# Patient Record
Sex: MALE | Race: WHITE | NOT HISPANIC OR LATINO | Employment: STUDENT | URBAN - METROPOLITAN AREA
[De-identification: names, ages, dates, MRNs, and addresses within clinical notes are randomized per-mention and may not be internally consistent; named-entity substitution may affect disease eponyms.]

---
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
## 2024-01-10 ENCOUNTER — OFFICE VISIT (OUTPATIENT)
Dept: GASTROENTEROLOGY | Facility: CLINIC | Age: 19
End: 2024-01-10
Payer: COMMERCIAL

## 2024-01-10 VITALS
BODY MASS INDEX: 20.92 KG/M2 | WEIGHT: 138 LBS | HEART RATE: 63 BPM | DIASTOLIC BLOOD PRESSURE: 64 MMHG | SYSTOLIC BLOOD PRESSURE: 120 MMHG | HEIGHT: 68 IN | OXYGEN SATURATION: 98 %

## 2024-01-10 DIAGNOSIS — R10.30 LOWER ABDOMINAL PAIN: ICD-10-CM

## 2024-01-10 DIAGNOSIS — R19.7 DIARRHEA, UNSPECIFIED TYPE: ICD-10-CM

## 2024-01-10 DIAGNOSIS — Z83.79 FAMILY HISTORY OF INFLAMMATORY BOWEL DISEASE: Primary | ICD-10-CM

## 2024-01-10 PROCEDURE — 99213 OFFICE O/P EST LOW 20 MIN: CPT | Performed by: PHYSICIAN ASSISTANT

## 2024-01-10 RX ORDER — DICYCLOMINE HCL 20 MG
20 TABLET ORAL EVERY 6 HOURS
Qty: 120 TABLET | Refills: 5 | Status: SHIPPED | OUTPATIENT
Start: 2024-01-10

## 2024-01-10 NOTE — PROGRESS NOTES
[facility] - Outpatient Follow-up Note  Tiburcio Aguayo 18 y.o. male MRN: [id number]  Encounter: [id number]          ASSESSMENT AND PLAN:      1. Family history of inflammatory bowel disease  Patient with family history of IBD in his brother and his sister although his symptoms seem to be more consistent with IBS, would recommend to get the fecal calprotectin as previously ordered at the very least but told him he can hold off on infectious studies since he is only going about 1-2 times per day but does have mildly elevated CRP so would prefer getting fecal calprotectin and if persistent elevation then may need colonoscopy.  Will repeat CRP at this time as well.    2. Diarrhea, unspecified type  Suspect symptoms are related to IBS as he has no significant findings on blood work such as anemia, only finding is mildly elevated CRP which we will repeat as above    3. Lower abdominal pain  Improved with Bentyl and he is taking this about twice a day which is helping with abdominal cramping as well as slowing stool frequency      We will see him in about 5 months for a follow-up.  Recommend to get the CRP repeated along with a fecal calprotectin as above need to have a mildly decreased IgA which we will repeat at this time.    ______________________________________________________________________    SUBJECTIVE:    This is a 18-year-old male who presents  for followup. Initially presented with complaints of abdominal cramping and decreased appetite.  Patient reports that the symptoms have been ongoing for few months and generally he has symptoms of cramping throughout the day and then has several bowel movements throughout the day about 3-5 times.  Does have family history of IBD in his brother and his sister and they were diagnosed with Crohn's disease.  Patient denies any nocturnal symptoms denies any significant weight loss.  He otherwise denies any rectal bleeding.  He did travel to  [place] in May of this year but reports that he did not have symptoms at that time.  He otherwise denies any rashes or fevers.  Denies any significant reflux symptoms.  Denies any difficulty swallowing.  Denies any significant nausea or vomiting symptoms.  Denies any excessive NSAID use.  Occasionally will take ibuprofen.  Patient does report that he had some fluctuation in bowel habits after he started taking Zoloft last year.  Reports that he was started on this for anxiety.  Currently a sophomore at [employer].  After last visit, blood work was performed and stool studies were ordered.  Does not appear stool studies were performed but blood work was essentially normal.  Patient was noted to have mildly decreased IgA which was to be repeated and a C-reactive protein noted to be elevated at 13 but top normal being 10 at the lab but this was drawn at.  Patient was started on Bentyl which was helping with his symptoms.  Currently, he states that he sometimes only goes 1-2 times per day previously 3-5.  He otherwise reports that he sometimes will have cramping sensation but this only last about half a day and improves with bowel movements.  No nocturnal symptoms and weight has been stable.    REVIEW OF SYSTEMS IS OTHERWISE NEGATIVE.      Historical Information   Past Medical History:   Diagnosis Date    Allergic rhinitis     Anxiety      No past surgical history on file.  Social History   Social History     Substance and Sexual Activity   Alcohol Use Never     Social History     Substance and Sexual Activity   Drug Use Never     Social History     Tobacco Use   Smoking Status Never    Passive exposure: Never   Smokeless Tobacco Never     No family history on file.    Meds/Allergies       Current Outpatient Medications:     CETIRIZINE HCL OP    dicyclomine (BENTYL) 20 mg tablet    sertraline (ZOLOFT) 25 mg tablet    sertraline (ZOLOFT) 50 mg tablet    No Known Allergies        Objective     There were no vitals  taken for this visit. There is no height or weight on file to calculate BMI.      PHYSICAL EXAM:      General Appearance:   Alert, cooperative, no distress   HEENT:   Normocephalic, atraumatic, anicteric.     Neck:  Supple, symmetrical, trachea midline   Lungs:   Clear to auscultation bilaterally; no rales, rhonchi or wheezing; respirations unlabored    Heart::   Regular rate and rhythm; no murmur, rub, or gallop.   Abdomen:   Soft, non-tender, non-distended; normal bowel sounds; no masses, no organomegaly    Genitalia:   Deferred    Rectal:   Deferred    Extremities:  No cyanosis, clubbing or edema    Pulses:  2+ and symmetric    Skin:  No jaundice, rashes, or lesions    Lymph nodes:  No palpable cervical lymphadenopathy        Lab Results:   No visits with results within 1 Day(s) from this visit.   Latest known visit with results is:   No results found for any previous visit.         Radiology Results:   No results found.

## 2024-09-15 ENCOUNTER — HOSPITAL ENCOUNTER (EMERGENCY)
Facility: HOSPITAL | Age: 19
Discharge: HOME | End: 2024-09-15
Attending: EMERGENCY MEDICINE
Payer: COMMERCIAL

## 2024-09-15 VITALS
SYSTOLIC BLOOD PRESSURE: 127 MMHG | DIASTOLIC BLOOD PRESSURE: 58 MMHG | HEART RATE: 90 BPM | BODY MASS INDEX: 21.3 KG/M2 | WEIGHT: 138.01 LBS | OXYGEN SATURATION: 97 % | RESPIRATION RATE: 20 BRPM | TEMPERATURE: 98.5 F

## 2024-09-15 DIAGNOSIS — J98.01 BRONCHOSPASM: ICD-10-CM

## 2024-09-15 DIAGNOSIS — U07.1 COVID-19: Primary | ICD-10-CM

## 2024-09-15 DIAGNOSIS — J02.9 SORE THROAT: ICD-10-CM

## 2024-09-15 LAB
ATRIAL RATE: 93 BPM
FLUAV AG UPPER RESP QL IA.RAPID: NEGATIVE
FLUBV AG UPPER RESP QL IA.RAPID: NEGATIVE
P AXIS: 74 DEGREES
PR INTERVAL: 114 MS
QRS AXIS: 75 DEGREES
QRSD INTERVAL: 98 MS
QT INTERVAL: 346 MS
QTC INTERVAL: 430 MS
SARS-COV+SARS-COV-2 AG RESP QL IA.RAPID: POSITIVE
T WAVE AXIS: 18 DEGREES
VENTRICULAR RATE: 93 BPM

## 2024-09-15 PROCEDURE — 87804 INFLUENZA ASSAY W/OPTIC: CPT | Performed by: EMERGENCY MEDICINE

## 2024-09-15 PROCEDURE — 93010 ELECTROCARDIOGRAM REPORT: CPT | Performed by: INTERNAL MEDICINE

## 2024-09-15 PROCEDURE — 99284 EMERGENCY DEPT VISIT MOD MDM: CPT

## 2024-09-15 PROCEDURE — 93005 ELECTROCARDIOGRAM TRACING: CPT

## 2024-09-15 PROCEDURE — 99284 EMERGENCY DEPT VISIT MOD MDM: CPT | Performed by: EMERGENCY MEDICINE

## 2024-09-15 PROCEDURE — 87811 SARS-COV-2 COVID19 W/OPTIC: CPT | Performed by: EMERGENCY MEDICINE

## 2024-09-15 RX ORDER — ALBUTEROL SULFATE 90 UG/1
2 INHALANT RESPIRATORY (INHALATION) ONCE
Status: COMPLETED | OUTPATIENT
Start: 2024-09-15 | End: 2024-09-15

## 2024-09-15 RX ORDER — NAPROXEN 250 MG/1
250 TABLET ORAL 2 TIMES DAILY WITH MEALS
Qty: 20 TABLET | Refills: 0 | Status: SHIPPED | OUTPATIENT
Start: 2024-09-15

## 2024-09-15 RX ORDER — ALBUTEROL SULFATE 90 UG/1
2 INHALANT RESPIRATORY (INHALATION) EVERY 4 HOURS PRN
Qty: 6.7 G | Refills: 0 | Status: SHIPPED | OUTPATIENT
Start: 2024-09-15

## 2024-09-15 RX ADMIN — DEXAMETHASONE SODIUM PHOSPHATE 10 MG: 10 INJECTION, SOLUTION INTRAMUSCULAR; INTRAVENOUS at 10:40

## 2024-09-15 RX ADMIN — ALBUTEROL SULFATE 2 PUFF: 90 AEROSOL, METERED RESPIRATORY (INHALATION) at 10:41

## 2024-09-15 NOTE — Clinical Note
Tiburcio Aguayo was seen and treated in our emergency department on 9/15/2024.                Diagnosis:     Tiburcio  .    He may return on this date: 09/23/2024         If you have any questions or concerns, please don't hesitate to call.      Eva Leach, FATUMA    ______________________________           _______________          _______________  Hospital Representative                              Date                                Time

## 2024-09-15 NOTE — ED PROVIDER NOTES
Pt Name: Tiburcio Aguayo  MRN: [id number]  Birthdate 2005  Age/Sex: 19 y.o. male  Date of evaluation: 9/15/2024  PCP: No primary care provider on file.    CHIEF COMPLAINT    Chief Complaint   Patient presents with    Shortness of Breath     Pt started with sore throat yesterday. Today woke up with tight throat, tightness in chest. Pt used 1 nebulizer PTA, hx of croup as child.        FINAL IMPRESSION    Final diagnoses:   COVID-19   Sore throat   Bronchospasm         DISPOSITION/PLAN    Presentation as above with sore throat and cough felt most consistent with COVID-19.  Description of shortness of breath prior to arrival that resolved albuterol concerning for some bronchospasm, likely viral induced although patient has had similar episodes in the past and has never undergone pulmonary function test per parents.  Do not suspect PTA, RPA, bacterial pneumonia, sepsis, angioedema, airway occlusion, other acute life threat.  Counseled regarding diagnosis of COVID-19, discharged with strict return precautions, follow with primary care doctor as well as pulmonology for pulmonary function test once this acute illness has resolved.  Time reflects when diagnosis was documented in both MDM as applicable and the Disposition within this note       Time User Action Codes Description Comment    9/15/2024 11:51 AM Zhang Miramontes Add [U07.1] COVID-19     9/15/2024 11:51 AM Zhang Miramontes Add [J02.9] Sore throat     9/15/2024 11:52 AM Zhang Miramontes Add [J98.01] Bronchospasm           ED Disposition       ED Disposition   Discharge    Condition   Stable    Date/Time   Sun Sep 15, 2024 11:51 AM    Comment   Tiburcio Aguayo discharge to home/self care.                   Follow-up Information       Follow up With Specialties Details Why Contact Info Additional Information    [facility] Emergency Department Emergency Medicine Go to  If symptoms worsen [address]  [place] [address]  [phone] [facility] Emergency Department, [address], [place], [place], [address]    [facility] Family Medicine Call in 1 day To discuss this visit and schedule close follow-up [address]  [address]  [facility] [phone] [facility], [address], [place] [address] [phone]    [facility] Pulmonology Call in 1 day To schedule pulmonary function testing once this current illness has resolved. [address]  [address]  [facility] [address] [phone] [facility], [address], [place], [place], [address] [phone]                HPI    19 y.o. male presenting with shortness of breath.  Patient states that he had a mild sore throat yesterday, woke today with worsening sore throat as well as a feeling that he could not breathe or catch his breath.  Patient had a nebulizer machine that was originally prescribed to him during a case of childhood croup, with albuterol but apparently was prescribed in 2021, used a nebulizer treatment with substantial improvement of symptoms.  He now complains of mild sore throat, pain is mild, burning, worse with swallowing, better with rest, nonradiating.  He denies trauma, fever, numbness, weakness, chest pain, other symptoms.      Past Medical and Surgical History    Past Medical History:   Diagnosis Date    Allergic rhinitis     Anxiety        History reviewed. No pertinent surgical history.    History reviewed. No pertinent family history.    Social History     Tobacco Use    Smoking status: Never     Passive exposure: Never    Smokeless tobacco: Never   Vaping Use    Vaping status: Never Used   Substance Use Topics    Alcohol use: Never    Drug use: Never           Allergies    No Known Allergies    Home Medications    Prior to Admission  medications    Medication Sig Start Date End Date Taking? Authorizing Provider   albuterol (ProAir HFA) 90 mcg/act inhaler Inhale 2 puffs every 4 (four) hours as needed for wheezing 9/15/24  Yes Zhang Miramontes MD   naproxen (NAPROSYN) 250 mg tablet Take 1 tablet (250 mg total) by mouth 2 (two) times a day with meals 9/15/24  Yes Zhang Miramontes MD   CETIRIZINE HCL OP Take by mouth  Patient not taking: Reported on 10/27/2023    Historical Provider, MD   dicyclomine (BENTYL) 20 mg tablet Take 1 tablet (20 mg total) by mouth every 6 (six) hours 10/27/23   Leti Mendez PA-C   dicyclomine (BENTYL) 20 mg tablet Take 1 tablet (20 mg total) by mouth every 6 (six) hours 1/10/24   Leti Mendez PA-C   sertraline (ZOLOFT) 50 mg tablet  10/12/23   Historical Provider, MD           Review of Systems    Review of Systems   Constitutional:  Negative for appetite change, chills and diaphoresis.   HENT:  Positive for sore throat. Negative for drooling, facial swelling, trouble swallowing and voice change.    Respiratory:  Positive for cough and shortness of breath. Negative for apnea and wheezing.    Cardiovascular:  Negative for chest pain and leg swelling.   Gastrointestinal:  Negative for abdominal distention, abdominal pain, diarrhea, nausea and vomiting.   Genitourinary:  Negative for dysuria and urgency.   Musculoskeletal:  Negative for arthralgias, back pain, gait problem and neck pain.   Skin:  Negative for color change, rash and wound.   Neurological:  Negative for seizures, speech difficulty, weakness and headaches.   Psychiatric/Behavioral:  Negative for agitation, behavioral problems and dysphoric mood. The patient is not nervous/anxious.            All other systems reviewed and negative.    Physical Exam      ED Triage Vitals [09/15/24 1000]   Temperature Pulse Respirations Blood Pressure SpO2   98.5 °F (36.9 °C) 100 (!) 26 125/60 99 %      Temp Source Heart Rate Source Patient Position -  Orthostatic VS BP Location FiO2 (%)   Oral Monitor Sitting Right arm --      Pain Score       --               Physical Exam  Vitals and nursing note reviewed.   Constitutional:       General: He is not in acute distress.     Appearance: He is well-developed. He is not ill-appearing, toxic-appearing or diaphoretic.   HENT:      Head: Normocephalic and atraumatic.      Right Ear: External ear normal.      Left Ear: External ear normal.      Nose: Nose normal. No congestion or rhinorrhea.      Mouth/Throat:      Mouth: Mucous membranes are moist.      Pharynx: Oropharynx is clear. Posterior oropharyngeal erythema present. No oropharyngeal exudate.      Comments: Erythema of the posterior oropharynx, no significant swelling, phonating normally, handling secretions, no trismus.  No bulge in the soft palate, no uvular deviation  Eyes:      Conjunctiva/sclera: Conjunctivae normal.      Pupils: Pupils are equal, round, and reactive to light.   Neck:      Trachea: No tracheal deviation.   Cardiovascular:      Rate and Rhythm: Normal rate and regular rhythm.      Pulses: Normal pulses.      Heart sounds: Normal heart sounds. No murmur heard.  Pulmonary:      Effort: Pulmonary effort is normal. No respiratory distress.      Breath sounds: Normal breath sounds. No stridor. No wheezing or rales.   Abdominal:      General: There is no distension.      Palpations: Abdomen is soft.      Tenderness: There is no abdominal tenderness. There is no guarding or rebound.   Musculoskeletal:         General: No deformity. Normal range of motion.      Cervical back: Normal range of motion and neck supple. No rigidity or tenderness.      Right lower leg: No edema.      Left lower leg: No edema.   Skin:     General: Skin is warm and dry.      Capillary Refill: Capillary refill takes less than 2 seconds.      Findings: No rash.   Neurological:      Mental Status: He is alert and oriented to person, place, and time.   Psychiatric:          Behavior: Behavior normal.         Thought Content: Thought content normal.         Judgment: Judgment normal.              Diagnostic Results      Labs:    Results Reviewed       Procedure Component Value Units Date/Time    FLU/COVID Rapid Antigen (30 min. TAT) - Preferred screening test in ED [[id number]]  (Abnormal) Collected: 09/15/24 1052    Lab Status: Final result Specimen: Nares from Nose Updated: 09/15/24 1115     SARS COV Rapid Antigen Positive     Influenza A Rapid Antigen Negative     Influenza B Rapid Antigen Negative    Narrative:      This test has been performed using the [employer] Stephanie 2 FLU+SARS Antigen test under the Emergency Use Authorization (EUA). This test has been validated by the  and verified by the performing laboratory. The Stephanie uses lateral flow immunofluorescent sandwich assay to detect SARS-COV, Influenza A and Influenza B Antigen.     The [employer] Stephanie 2 SARS Antigen test does not differentiate between SARS-CoV and SARS-CoV-2.     Negative results are presumptive and may be confirmed with a molecular assay, if necessary, for patient management. Negative results do not rule out SARS-CoV-2 or influenza infection and should not be used as the sole basis for treatment or patient management decisions. A negative test result may occur if the level of antigen in a sample is below the limit of detection of this test.     Positive results are indicative of the presence of viral antigens, but do not rule out bacterial infection or co-infection with other viruses.     All test results should be used as an adjunct to clinical observations and other information available to the provider.    FOR PEDIATRIC PATIENTS - copy/paste COVID Guidelines URL to browser: [email or web address]            All labs reviewed and utilized in the medical decision making process    Radiology:    No orders to display       All radiology studies  independently viewed by me and interpreted by the radiologist.    Procedure    Procedures        ED Course of Care and Re-Assessments      Symptoms much improved with dexamethasone and albuterol.  COVID swab sent and returned positive.    Medications   dexamethasone oral liquid 10 mg 1 mL (10 mg Oral Given 9/15/24 1040)   albuterol (PROVENTIL HFA,VENTOLIN HFA) inhaler 2 puff (2 puffs Inhalation Given 9/15/24 1041)           PATIENT REFERRED TO:    [facility] Emergency Department  [address]  [place] [address]  [phone]  Go to   If symptoms worsen    [facility]  [address]  [address]  [facility] [address] [phone]  Call in 1 day  To discuss this visit and schedule close follow-up    [facility]  [address]  [address]  [facility] [address] [phone]  Call in 1 day  To schedule pulmonary function testing once this current illness has resolved.      DISCHARGE MEDICATIONS:    Discharge Medication List as of 9/15/2024 12:02 PM        START taking these medications    Details   albuterol (ProAir HFA) 90 mcg/act inhaler Inhale 2 puffs every 4 (four) hours as needed for wheezing, Starting Sun 9/15/2024, Print      naproxen (NAPROSYN) 250 mg tablet Take 1 tablet (250 mg total) by mouth 2 (two) times a day with meals, Starting Sun 9/15/2024, Print           CONTINUE these medications which have NOT CHANGED    Details   CETIRIZINE HCL OP Take by mouth, Historical Med      !! dicyclomine (BENTYL) 20 mg tablet Take 1 tablet (20 mg total) by mouth every 6 (six) hours, Starting Fri 10/27/2023, Normal      !! dicyclomine (BENTYL) 20 mg tablet Take 1 tablet (20 mg total) by mouth every 6 (six) hours, Starting Wed 1/10/2024, Normal      sertraline (ZOLOFT) 50 mg tablet Historical Med       !! - Potential duplicate medications found. Please discuss with provider.          No discharge procedures on  "file.         Zhang Miramontes MD    Portions of the record may have been created with voice recognition software.  Occasional wrong word or \"sound alike\" substitutions may have occurred due to the inherent limitations of voice recognition software.  Please read the chart carefully and recognize, using context, where substitutions have occurred     Zhang Miramontes MD  09/15/24 9464    "

## 2024-09-16 NOTE — ED NOTES
Pt called asking for a work note. Work note printed and put at registation for someone to  for him. Pt aware and verbalized understanding.      Eva Leach RN  09/16/24 1919

## 2025-02-17 ENCOUNTER — TELEPHONE (OUTPATIENT)
Age: 20
End: 2025-02-17

## 2025-02-17 DIAGNOSIS — F41.9 ANXIETY: Primary | ICD-10-CM

## 2025-02-17 NOTE — TELEPHONE ENCOUNTER
Please call patient offer 30 min establish care-if he'd like while he waits for psych. I am list as pcp but never saw patient

## 2025-02-17 NOTE — TELEPHONE ENCOUNTER
Patients parents called because he has been dealing with anxiety and would like to discuss medications with a Psych/mental health doctor but they are not able to get him in for a consult for a long time. They were advised to reach out to his pcp Jane for a expedited referral. They said he would like to see Dr.Erik Mejia or Carlos Gonzales. Please advise. Thank you.

## 2025-02-17 NOTE — TELEPHONE ENCOUNTER
Father and Mother called. They thought they had spoke to the [place] office and they told him 3 mos to 1 yr for appts but I informed them that any of the office numbers are routed to the Call Center and that son was placed on the [facility] for MM and TT.    He states they are looking for an appt for son ASAP. Writer informed that he is on the WL and we can not give a timeframe. Writer informed that they should call his PCP and let them know what is going on and to ask them to send a referral. Writer added PCP to the file. It is a [facility]'s facility in [place], [place].    Writer asked if outside resource pkt was sent and he verified they received it already.

## 2025-02-17 NOTE — TELEPHONE ENCOUNTER
Patients mother called in with the patient on the line, to be added to the wait list for psyc services.     Patient has been added to the Medication Management and Talk Therapy wait list without a referral.    Insurance: Blue Cross  Insurance Type:    Commercial [x]   Medicaid []   [facility] (if applicable)   Medicare []  Location Preference: Any  Provider Preference: Any  Virtual: Yes [x] No []  Were outside resources sent: Yes [x] No []

## 2025-02-17 NOTE — TELEPHONE ENCOUNTER
Called and spoke to mom she stated that he actually got an appt with his old ped doctor , which specializes in physc ,, this week ,,   Mom stated that she really would like him to establish care with real so she will keep us posted with what happens with his ped doctor and schedule an appt with real after that she stated she does not want to over whelm him at this time

## 2025-02-17 NOTE — TELEPHONE ENCOUNTER
Mother called in wanting to schedule a np appt with for pt. Writer let mother know that pt would have to confirm that they are looking for services. Mother understood

## 2025-06-25 ENCOUNTER — OFFICE VISIT (OUTPATIENT)
Dept: FAMILY MEDICINE CLINIC | Facility: CLINIC | Age: 20
End: 2025-06-25
Payer: COMMERCIAL

## 2025-06-25 VITALS
TEMPERATURE: 98.6 F | HEART RATE: 86 BPM | DIASTOLIC BLOOD PRESSURE: 66 MMHG | BODY MASS INDEX: 22.91 KG/M2 | WEIGHT: 146 LBS | SYSTOLIC BLOOD PRESSURE: 116 MMHG | RESPIRATION RATE: 16 BRPM | OXYGEN SATURATION: 99 % | HEIGHT: 67 IN

## 2025-06-25 DIAGNOSIS — F43.20 ADJUSTMENT REACTION OF ADOLESCENCE: ICD-10-CM

## 2025-06-25 DIAGNOSIS — F41.9 ANXIETY: ICD-10-CM

## 2025-06-25 DIAGNOSIS — Z00.00 ANNUAL PHYSICAL EXAM: Primary | ICD-10-CM

## 2025-06-25 PROCEDURE — 99385 PREV VISIT NEW AGE 18-39: CPT | Performed by: NURSE PRACTITIONER

## 2025-06-25 NOTE — PROGRESS NOTES
Adult Annual Physical  Name: Tiburcio Aguayo      : 2005      MRN: [id number]  Encounter Provider: AVE Lo  Encounter Date: 2025   [facility]    :  Assessment & Plan  Annual physical exam               Preventive Screenings:  - Diabetes Screening: risks/benefits discussed and patient declines  - Cholesterol Screening: risks/benefits discussed and patient declines   - Hepatitis C screening: risks/benefits discussed and patient declines   - HIV screening: risks/benefits discussed and patient declines   - Colon cancer screening: screening not indicated   - Lung cancer screening: screening not indicated   - Prostate cancer screening: screening not indicated     Immunizations:  - Immunizations due: Tdap and HPV (Gardasil 9)    Counseling/Anticipatory Guidance:    - Dental health: discussed importance of regular tooth brushing, flossing, and dental visits.   - Sexual health: discussed sexually transmitted diseases, partner selection, use of condoms, avoidance of unintended pregnancy, and contraceptive alternatives.   - Diet: discussed recommendations for a healthy/well-balanced diet.   - Exercise: the importance of regular exercise/physical activity was discussed. Recommend exercise 3-5 times per week for at least 30 minutes.   - Injury prevention: discussed safety/seat belts, safety helmets, smoke detectors, carbon monoxide detectors, and smoking near bedding or upholstery.     Depression Screening and Follow-up Plan: Patient was screened for depression during today's encounter. They screened negative with a PHQ-2 score of 2.      History of Present Illness     Adult Annual Physical:  Patient presents for annual physical.     Diet and Physical Activity:  - Diet/Nutrition: well balanced diet.  - Exercise: 3-4 times a week on average.    Depression Screening:  - PHQ-2 Score: 2    General Health:  - Sleep: sleeps well.  - Hearing: normal hearing  "bilateral ears.  - Vision: no vision problems.  - Dental: regular dental visits.    /GYN Health:    - History of STDs: no     Health:  - History of STDs: no.   Review of Systems   Constitutional:  Negative for fatigue, fever and unexpected weight change.   HENT:  Negative for trouble swallowing.    Respiratory:  Negative for cough and shortness of breath.    Cardiovascular:  Negative for chest pain, palpitations and leg swelling.   Gastrointestinal:  Negative for abdominal pain and blood in stool.   Endocrine: Negative.    Genitourinary:  Negative for dysuria, genital sores, hematuria and testicular pain.   Musculoskeletal:  Negative for arthralgias and back pain.   Skin:  Negative for pallor and rash.   Neurological:  Negative for dizziness, syncope and weakness.   Hematological:  Does not bruise/bleed easily.   Psychiatric/Behavioral:  Positive for sleep disturbance. Negative for dysphoric mood. The patient is nervous/anxious.        Objective   /66 (BP Location: Left arm, Patient Position: Sitting, Cuff Size: Standard)   Pulse 86   Temp 98.6 °F (37 °C) (Temporal)   Resp 16   Ht 5' 7\" (1.702 m)   Wt 66.2 kg (146 lb)   SpO2 99%   BMI 22.87 kg/m²     Physical Exam  Vitals and nursing note reviewed.   Constitutional:       General: He is not in acute distress.     Appearance: Normal appearance. He is well-developed.   HENT:      Head: Normocephalic and atraumatic.      Right Ear: Tympanic membrane normal.      Left Ear: Tympanic membrane normal.      Mouth/Throat:      Mouth: Mucous membranes are moist.      Pharynx: Oropharynx is clear.     Eyes:      Conjunctiva/sclera: Conjunctivae normal.      Pupils: Pupils are equal, round, and reactive to light.       Cardiovascular:      Rate and Rhythm: Normal rate and regular rhythm.      Pulses: Normal pulses.      Heart sounds: Normal heart sounds.   Pulmonary:      Effort: Pulmonary effort is normal. No respiratory distress.      Breath sounds: Normal " breath sounds.   Abdominal:      Palpations: Abdomen is soft.      Tenderness: There is no abdominal tenderness.     Musculoskeletal:         General: No deformity.      Cervical back: Normal range of motion and neck supple.   Lymphadenopathy:      Cervical: No cervical adenopathy.     Skin:     General: Skin is warm and dry.      Capillary Refill: Capillary refill takes less than 2 seconds.      Coloration: Skin is not pale.     Neurological:      General: No focal deficit present.      Mental Status: He is alert.      Motor: No weakness.     Psychiatric:         Mood and Affect: Mood normal.         Behavior: Behavior normal.

## 2025-06-25 NOTE — PATIENT INSTRUCTIONS
"Patient Education     Routine physical for adults   The Basics   Written by the doctors and editors at [facility]   What is a physical? -- A physical is a routine visit, or \"check-up,\" with your doctor. You might also hear it called a \"wellness visit\" or \"preventive visit.\"  During each visit, the doctor will:   Ask about your physical and mental health   Ask about your habits, behaviors, and lifestyle   Do an exam   Give you vaccines if needed   Talk to you about any medicines you take   Give advice about your health   Answer your questions  Getting regular check-ups is an important part of taking care of your health. It can help your doctor find and treat any problems you have. But it's also important for preventing health problems.  A routine physical is different from a \"sick visit.\" A sick visit is when you see a doctor because of a health concern or problem. Since physicals are scheduled ahead of time, you can think about what you want to ask the doctor.  How often should I get a physical? -- It depends on your age and health. In general, for people age 21 years and older:   If you are younger than 50 years, you might be able to get a physical every 3 years.   If you are 50 years or older, your doctor might recommend a physical every year.  If you have an ongoing health condition, like diabetes or high blood pressure, your doctor will probably want to see you more often.  What happens during a physical? -- In general, each visit will include:   Physical exam - The doctor or nurse will check your height, weight, heart rate, and blood pressure. They will also look at your eyes and ears. They will ask about how you are feeling and whether you have any symptoms that bother you.   Medicines - It's a good idea to bring a list of all the medicines you take to each doctor visit. Your doctor will talk to you about your medicines and answer any questions. Tell them if you are having any side effects that bother you. You " "should also tell them if you are having trouble paying for any of your medicines.   Habits and behaviors - This includes:   Your diet   Your exercise habits   Whether you smoke, drink alcohol, or use drugs   Whether you are sexually active   Whether you feel safe at home  Your doctor will talk to you about things you can do to improve your health and lower your risk of health problems. They will also offer help and support. For example, if you want to quit smoking, they can give you advice and might prescribe medicines. If you want to improve your diet or get more physical activity, they can help you with this, too.   Lab tests, if needed - The tests you get will depend on your age and situation. For example, your doctor might want to check your:   Cholesterol   Blood sugar   Iron level   Vaccines - The recommended vaccines will depend on your age, health, and what vaccines you already had. Vaccines are very important because they can prevent certain serious or deadly infections.   Discussion of screening - \"Screening\" means checking for diseases or other health problems before they cause symptoms. Your doctor can recommend screening based on your age, risk, and preferences. This might include tests to check for:   Cancer, such as breast, prostate, cervical, ovarian, colorectal, prostate, lung, or skin cancer   Sexually transmitted infections, such as chlamydia and gonorrhea   Mental health conditions like depression and anxiety  Your doctor will talk to you about the different types of screening tests. They can help you decide which screenings to have. They can also explain what the results might mean.   Answering questions - The physical is a good time to ask the doctor or nurse questions about your health. If needed, they can refer you to other doctors or specialists, too.  Adults older than 65 years often need other care, too. As you get older, your doctor will talk to you about:   How to prevent falling at " home   Hearing or vision tests   Memory testing   How to take your medicines safely   Making sure that you have the help and support you need at home  All topics are updated as new evidence becomes available and our peer review process is complete.  This topic retrieved from [employer] on: May 02, 2024.  Topic 864243 Version 1.0  Release: 32.4.3 - C32.122  © 2024 UpToDate, Inc. and/or its affiliates. All rights reserved.  Consumer Information Use and Disclaimer   Disclaimer: This generalized information is a limited summary of diagnosis, treatment, and/or medication information. It is not meant to be comprehensive and should be used as a tool to help the user understand and/or assess potential diagnostic and treatment options. It does NOT include all information about conditions, treatments, medications, side effects, or risks that may apply to a specific patient. It is not intended to be medical advice or a substitute for the medical advice, diagnosis, or treatment of a health care provider based on the health care provider's examination and assessment of a patient's specific and unique circumstances. Patients must speak with a health care provider for complete information about their health, medical questions, and treatment options, including any risks or benefits regarding use of medications. This information does not endorse any treatments or medications as safe, effective, or approved for treating a specific patient. UpToDate, Inc. and its affiliates disclaim any warranty or liability relating to this information or the use thereof.The use of this information is governed by the Terms of Use, available at https://www.woltersCircalituwer.com/en/know/clinical-effectiveness-terms. 2024© UpToDate, Inc. and its affiliates and/or licensors. All rights reserved.  Copyright   © 2024 UpToDate, Inc. and/or its affiliates. All rights reserved.

## 2025-06-26 ENCOUNTER — TELEPHONE (OUTPATIENT)
Age: 20
End: 2025-06-26

## 2025-06-26 NOTE — TELEPHONE ENCOUNTER
"Behavioral Health Outpatient Intake Questions    Referred By   : PCP    Please advise interviewee that they need to answer all questions truthfully to allow for best care, and any misrepresentations of information may affect their ability to be seen at this clinic   => Was this discussed? Yes     If Minor Child (under age 18)    Who is/are the legal guardian(s) of the child?     Is there a custody agreement?      If \"YES\"- Custody orders must be obtained prior to scheduling the first appointment  In addition, Consent to Treatment must be signed by all legal guardians prior to scheduling the first appointment    If \"NO\"- Consent to Treatment must be signed by all legal guardians prior to scheduling the first appointment    Behavioral Health Outpatient Intake History -     Presenting Problem (in patient's own words): Patient states he has anxiety making it difficult to stay on top of things.    Are there any communication barriers for this patient?     No                                               If yes, please describe barriers:   If there is a unique situation, please refer to Jordy Jorge/Rosmery Hoyt for final determination.    Are you taking any psychiatric medications?      If \"YES\" -What are they Zoloft     If \"YES\" -Who prescribes? PCP    Has the Patient previously received outpatient Talk Therapy or Medication Management from [facility]   NO       If \"YES\"- When, Where and with Whom?         If \"NO\" -Has Patient received these services elsewhere?       If \"YES\" -When, Where, and with Whom?    Has the Patient abused alcohol or other substances in the last 6 months ? No       If \"YES\" -What substance, How much, How often?     If illegal substance: Refer to [facility] (for BRAD) or [facility] Offices.   If Alcohol in excess of 10 drinks per week:  Refer to [facility] (for BRAD) or [facility] Offices    Legal History-     Is this treatment court ordered? No   If \"yes \"send to :  Talk Therapy : Send to Jordy" "Luisito for final determination   Med Management: Send to Dr. Hargrove for final determination     Has the Patient been convicted of a felony?  NO   If \"Yes\" send to -When, What?  Talk Therapy: Send to Jordy Jorge for final determination   Med Management: Send to Dr. Hargrove for final determination     ACCEPTED as a patient Yes  If \"Yes\" Appointment Date: Suhas Foster 7/1/25 at 2pm with f/u 7/22/25 at 9am    Referred Elsewhere? No  If “Yes” - (Where? Ex: [facility], [facility], [facility], [facility], etc.)       Name of Insurance Co:[employer]  Insurance ID# [id number]   Insurance Phone # [phone]  If ins is primary or secondary? Primary   If patient is a minor, parents information such as Name, D.O.B of guarantor.  NP forms sent via [employer]  "

## 2025-06-26 NOTE — TELEPHONE ENCOUNTER
[facility] a [facility] Upon chart review, pt is currently scheduled for TT services on 7/1/25.

## 2025-07-01 ENCOUNTER — OFFICE VISIT (OUTPATIENT)
Dept: BEHAVIORAL/MENTAL HEALTH CLINIC | Facility: CLINIC | Age: 20
End: 2025-07-01
Payer: COMMERCIAL

## 2025-07-01 DIAGNOSIS — F43.20 ADJUSTMENT REACTION OF ADOLESCENCE: ICD-10-CM

## 2025-07-01 DIAGNOSIS — F41.1 GENERALIZED ANXIETY DISORDER: Primary | ICD-10-CM

## 2025-07-01 DIAGNOSIS — F41.9 ANXIETY: ICD-10-CM

## 2025-07-01 PROCEDURE — 90791 PSYCH DIAGNOSTIC EVALUATION: CPT
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
## 2025-07-09 ENCOUNTER — SOCIAL WORK (OUTPATIENT)
Dept: BEHAVIORAL/MENTAL HEALTH CLINIC | Facility: CLINIC | Age: 20
End: 2025-07-09
Payer: COMMERCIAL

## 2025-07-09 DIAGNOSIS — F41.1 GENERALIZED ANXIETY DISORDER: Primary | ICD-10-CM

## 2025-07-09 PROCEDURE — 90834 PSYTX W PT 45 MINUTES: CPT
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
## 2025-07-09 NOTE — BH TREATMENT PLAN
Outpatient Behavioral Health Psychotherapy Treatment Plan    Tiburcio Aguayo  2005     Date of Initial Psychotherapy Assessment: 7/1/2025   Date of Current Treatment Plan: 07/09/25  Treatment Plan Target Date: 12/31/2025  Treatment Plan Expiration Date: 12/31/2025    Diagnosis:   1. Generalized anxiety disorder            Area(s) of Need: Anxiety    Long Term Goal 1 (in the client's own words): I want to manage my anxiety and build confidence    Stage of Change: Action    Target Date for completion: 12/31/2025     Anticipated therapeutic modalities: CBT     People identified to complete this goal: client and counselor      Objective 1: (identify the means of measuring success in meeting the objective): ct will identify 3 coping skills to use when anxious.  Ct will identify strengths in socializing to build confidence         I am currently under the care of a [facility] psychiatric provider: no    My [facility] psychiatric provider is: NA    I am currently taking psychiatric medications: Yes, as prescribed    I feel that I will be ready for discharge from mental health care when I reach the following (measurable goal/objective): I'm not sure    For children and adults who have a legal guardian:   Has there been any change to custody orders and/or guardianship status? No. If yes, attach updated documentation.    I have created my Crisis Plan and have been offered a copy of this plan    Behavioral Health Treatment Plan [facility]: Diagnosis and Treatment Plan explained to Tiburcio Aguayo acknowledges an understanding of their diagnosis. Tiburcio Aguayo agrees to this treatment plan.    I have been offered a copy of this Treatment Plan. yes

## 2025-07-09 NOTE — BH CRISIS PLAN
Client Name: Tiburcio Aguayo       Client YOB: 2005    SanchoDavid Safety Plan      Creation Date: 7/9/25 Update Date: 7/9/26   Created By: Suhas Fostre LPC       Step 1: Warning Signs:   Warning Signs   heart race   Shake   Sweaty palms   decreased focus            Step 2: Internal Coping Strategies:   Internal Coping Strategies   go for a walk            Step 3: People and social settings that provide distraction:   Name Contact Information   Mom at home in cell    Places   off campus house           Step 4: People whom I can ask for help during a crisis:      Name Contact Information    Mom at home in cell      Step 5: Professionals or agencies I can contact during a crisis:      Clinican/Agency Name Phone Emergency Contact    suicide and crisis lifeline 988       Local Emergency Department Emergency Department Phone Emergency Department Address     [address]          Crisis Phone Numbers:   Suicide Prevention Lifeline: Call or Text  984 Crisis Text Line: Text HOME to 594-024   Please note: Some [facility] do not have a separate number for Child/Adolescent specific crisis. If your county is not listed under Child/Adolescent, please call the adult number for your county      Adult Crisis Numbers: Child/Adolescent Crisis Numbers   [facility]: [phone] [facility]: [phone]   [facility]: [phone] [facility]: [phone]   [facility]: [phone] [place]: [phone]   [facility]: [phone] [place]: [phone]   [facility]: [phone]   [facility]: [phone]   [facility]: [phone]   [facility]: [phone] (daytime) [phone] (after hours, weekends, holidays)      Step 6: Making the environment safer (plan for lethal means safety):   Patient did not identify any lethal methods: Yes     Optional: What is most important to me and worth living for?   My future      Tiki Safety Plan. Cecily Kingsley and Doron Hernandez. Used with permission of the authors.

## 2025-07-13 ENCOUNTER — HOSPITAL ENCOUNTER (EMERGENCY)
Facility: HOSPITAL | Age: 20
Discharge: HOME | End: 2025-07-13
Attending: EMERGENCY MEDICINE | Admitting: EMERGENCY MEDICINE
Payer: COMMERCIAL

## 2025-07-13 ENCOUNTER — APPOINTMENT (EMERGENCY)
Dept: RADIOLOGY | Facility: HOSPITAL | Age: 20
End: 2025-07-13
Payer: COMMERCIAL

## 2025-07-13 VITALS
RESPIRATION RATE: 20 BRPM | SYSTOLIC BLOOD PRESSURE: 115 MMHG | DIASTOLIC BLOOD PRESSURE: 59 MMHG | OXYGEN SATURATION: 97 % | TEMPERATURE: 97.9 F | HEART RATE: 52 BPM

## 2025-07-13 DIAGNOSIS — R10.13 EPIGASTRIC PAIN: ICD-10-CM

## 2025-07-13 DIAGNOSIS — K21.9 ACID REFLUX: Primary | ICD-10-CM

## 2025-07-13 LAB
ALBUMIN SERPL BCG-MCNC: 4.9 G/DL (ref 3.5–5)
ALP SERPL-CCNC: 35 U/L (ref 34–104)
ALT SERPL W P-5'-P-CCNC: 11 U/L (ref 7–52)
ANION GAP SERPL CALCULATED.3IONS-SCNC: 8 MMOL/L (ref 4–13)
AST SERPL W P-5'-P-CCNC: 16 U/L (ref 13–39)
BASOPHILS # BLD AUTO: 0.07 THOUSANDS/ÂΜL (ref 0–0.1)
BASOPHILS NFR BLD AUTO: 1 % (ref 0–1)
BILIRUB SERPL-MCNC: 0.58 MG/DL (ref 0.2–1)
BUN SERPL-MCNC: 21 MG/DL (ref 5–25)
CALCIUM SERPL-MCNC: 9.8 MG/DL (ref 8.4–10.2)
CARDIAC TROPONIN I PNL SERPL HS: <2 NG/L (ref ?–50)
CHLORIDE SERPL-SCNC: 102 MMOL/L (ref 96–108)
CO2 SERPL-SCNC: 24 MMOL/L (ref 21–32)
CREAT SERPL-MCNC: 0.99 MG/DL (ref 0.6–1.3)
EOSINOPHIL # BLD AUTO: 0.14 THOUSAND/ÂΜL (ref 0–0.61)
EOSINOPHIL NFR BLD AUTO: 2 % (ref 0–6)
ERYTHROCYTE [DISTWIDTH] IN BLOOD BY AUTOMATED COUNT: 11.9 % (ref 11.6–15.1)
GFR SERPL CREATININE-BSD FRML MDRD: 108 ML/MIN/1.73SQ M
GLUCOSE SERPL-MCNC: 96 MG/DL (ref 65–140)
HCT VFR BLD AUTO: 41.7 % (ref 36.5–49.3)
HGB BLD-MCNC: 14.6 G/DL (ref 12–17)
IMM GRANULOCYTES # BLD AUTO: 0.07 THOUSAND/UL (ref 0–0.2)
IMM GRANULOCYTES NFR BLD AUTO: 1 % (ref 0–2)
LIPASE SERPL-CCNC: 13 U/L (ref 11–82)
LYMPHOCYTES # BLD AUTO: 2.45 THOUSANDS/ÂΜL (ref 0.6–4.47)
LYMPHOCYTES NFR BLD AUTO: 30 % (ref 14–44)
MCH RBC QN AUTO: 30.7 PG (ref 26.8–34.3)
MCHC RBC AUTO-ENTMCNC: 35 G/DL (ref 31.4–37.4)
MCV RBC AUTO: 88 FL (ref 82–98)
MONOCYTES # BLD AUTO: 0.88 THOUSAND/ÂΜL (ref 0.17–1.22)
MONOCYTES NFR BLD AUTO: 11 % (ref 4–12)
NEUTROPHILS # BLD AUTO: 4.49 THOUSANDS/ÂΜL (ref 1.85–7.62)
NEUTS SEG NFR BLD AUTO: 55 % (ref 43–75)
NRBC BLD AUTO-RTO: 0 /100 WBCS
PLATELET # BLD AUTO: 310 THOUSANDS/UL (ref 149–390)
PMV BLD AUTO: 9.4 FL (ref 8.9–12.7)
POTASSIUM SERPL-SCNC: 3.5 MMOL/L (ref 3.5–5.3)
PROT SERPL-MCNC: 7.3 G/DL (ref 6.4–8.4)
RBC # BLD AUTO: 4.75 MILLION/UL (ref 3.88–5.62)
SODIUM SERPL-SCNC: 134 MMOL/L (ref 135–147)
WBC # BLD AUTO: 8.1 THOUSAND/UL (ref 4.31–10.16)

## 2025-07-13 PROCEDURE — 96361 HYDRATE IV INFUSION ADD-ON: CPT

## 2025-07-13 PROCEDURE — 99285 EMERGENCY DEPT VISIT HI MDM: CPT | Performed by: EMERGENCY MEDICINE

## 2025-07-13 PROCEDURE — 71046 X-RAY EXAM CHEST 2 VIEWS: CPT

## 2025-07-13 PROCEDURE — 80053 COMPREHEN METABOLIC PANEL: CPT

## 2025-07-13 PROCEDURE — 84484 ASSAY OF TROPONIN QUANT: CPT

## 2025-07-13 PROCEDURE — 93005 ELECTROCARDIOGRAM TRACING: CPT

## 2025-07-13 PROCEDURE — 99285 EMERGENCY DEPT VISIT HI MDM: CPT

## 2025-07-13 PROCEDURE — 83690 ASSAY OF LIPASE: CPT | Performed by: EMERGENCY MEDICINE

## 2025-07-13 PROCEDURE — 36415 COLL VENOUS BLD VENIPUNCTURE: CPT

## 2025-07-13 PROCEDURE — 96374 THER/PROPH/DIAG INJ IV PUSH: CPT

## 2025-07-13 PROCEDURE — 85025 COMPLETE CBC W/AUTO DIFF WBC: CPT

## 2025-07-13 PROCEDURE — 96375 TX/PRO/DX INJ NEW DRUG ADDON: CPT

## 2025-07-13 RX ORDER — ONDANSETRON 4 MG/1
4 TABLET, ORALLY DISINTEGRATING ORAL EVERY 6 HOURS PRN
Qty: 20 TABLET | Refills: 0 | Status: SHIPPED | OUTPATIENT
Start: 2025-07-13

## 2025-07-13 RX ORDER — FAMOTIDINE 20 MG/1
20 TABLET, FILM COATED ORAL 2 TIMES DAILY
Qty: 30 TABLET | Refills: 0 | Status: SHIPPED | OUTPATIENT
Start: 2025-07-13 | End: 2025-07-14 | Stop reason: SDUPTHER

## 2025-07-13 RX ORDER — ONDANSETRON 4 MG/1
4 TABLET, ORALLY DISINTEGRATING ORAL EVERY 6 HOURS PRN
Qty: 20 TABLET | Refills: 0 | Status: SHIPPED | OUTPATIENT
Start: 2025-07-13 | End: 2025-07-13

## 2025-07-13 RX ORDER — MAGNESIUM HYDROXIDE/ALUMINUM HYDROXICE/SIMETHICONE 120; 1200; 1200 MG/30ML; MG/30ML; MG/30ML
30 SUSPENSION ORAL ONCE
Status: COMPLETED | OUTPATIENT
Start: 2025-07-13 | End: 2025-07-13

## 2025-07-13 RX ORDER — ALUMINUM HYDROXIDE, MAGNESIUM HYDROXIDE, SIMETHICONE 400; 400; 40 MG/10ML; MG/10ML; MG/10ML
30 SUSPENSION ORAL
Qty: 355 ML | Refills: 0 | Status: SHIPPED | OUTPATIENT
Start: 2025-07-13

## 2025-07-13 RX ORDER — ONDANSETRON 2 MG/ML
4 INJECTION INTRAMUSCULAR; INTRAVENOUS ONCE
Status: COMPLETED | OUTPATIENT
Start: 2025-07-13 | End: 2025-07-13

## 2025-07-13 RX ORDER — FAMOTIDINE 10 MG/ML
20 INJECTION, SOLUTION INTRAVENOUS ONCE
Status: COMPLETED | OUTPATIENT
Start: 2025-07-13 | End: 2025-07-13

## 2025-07-13 RX ADMIN — FAMOTIDINE 20 MG: 10 INJECTION, SOLUTION INTRAVENOUS at 04:30

## 2025-07-13 RX ADMIN — ONDANSETRON 4 MG: 2 INJECTION INTRAMUSCULAR; INTRAVENOUS at 04:30

## 2025-07-13 RX ADMIN — ALUMINUM HYDROXIDE, MAGNESIUM HYDROXIDE, AND DIMETHICONE 30 ML: 200; 20; 200 SUSPENSION ORAL at 04:23

## 2025-07-13 RX ADMIN — SODIUM CHLORIDE 1000 ML: 0.9 INJECTION, SOLUTION INTRAVENOUS at 04:30

## 2025-07-13 NOTE — ED PROVIDER NOTES
Time reflects when diagnosis was documented in both MDM as applicable and the Disposition within this note       Time User Action Codes Description Comment    7/13/2025  5:26 AM Selina Sandhu [K21.9] Acid reflux     7/13/2025  5:27 AM Selina Sandhu [R10.13] Epigastric pain           ED Disposition       ED Disposition   Discharge    Condition   Stable    Date/Time   Sun Jul 13, 2025  5:26 AM    Comment   Tiburcio Aguayo discharge to home/self care.                   Assessment & Plan       Medical Decision Making  Amount and/or Complexity of Data Reviewed  Labs: ordered. Decision-making details documented in ED Course.  Radiology: ordered and independent interpretation performed.    Risk  OTC drugs.  Prescription drug management.    Patient is a 20-year-old male presenting for reflux symptoms onset 3 hours prior to arrival.  Patient appears uncomfortable on exam.  Workup showed no acute findings, troponin less than 2, symptoms ongoing for 3 hours and more, with unremarkable EKG, low suspicion for ACS.  Lipase within normal limits.  Patient was given IV fluids, famotidine, Zofran, Maalox with improvement of his symptoms.  Patient reports feeling much better, and was able to tolerate p.o.  Patient ambulated out of the ER without any difficulties.  Rx for medications given also provided.  Referral for GI ordered and PCP follow-up.  Patient GCS 15 on discharge.    ED Course as of 07/13/25 0857   Sun Jul 13, 2025   0525 hs TnI 0hr: <2       Medications   aluminum-magnesium hydroxide-simethicone (MAALOX) oral suspension 30 mL (30 mL Oral Given 7/13/25 0423)   ondansetron (ZOFRAN) injection 4 mg (4 mg Intravenous Given 7/13/25 0430)   sodium chloride 0.9 % bolus 1,000 mL (0 mL Intravenous Stopped 7/13/25 0536)   Famotidine (PF) (PEPCID) injection 20 mg (20 mg Intravenous Given 7/13/25 0430)       ED Risk Strat Scores      HEART Risk Score      Flowsheet Row Most Recent Value   Heart Score Risk Calculator     History 0 Filed at: 07/13/2025 0856   ECG 0 Filed at: 07/13/2025 0856   Age 0 Filed at: 07/13/2025 0856   Risk Factors 0 Filed at: 07/13/2025 0856   Troponin 0 Filed at: 07/13/2025 0856   HEART Score 0 Filed at: 07/13/2025 0856                      No data recorded        SBIRT 20yo+      Flowsheet Row Most Recent Value   Initial Alcohol Screen: US AUDIT-C     1. How often do you have a drink containing alcohol? 0 Filed at: 07/13/2025 0248   2. How many drinks containing alcohol do you have on a typical day you are drinking?  0 Filed at: 07/13/2025 0248   3a. Male UNDER 65: How often do you have five or more drinks on one occasion? 0 Filed at: 07/13/2025 0248   Audit-C Score 0 Filed at: 07/13/2025 0248   GERTRUDE: How many times in the past year have you...    Used an illegal drug or used a prescription medication for non-medical reasons? Never Filed at: 07/13/2025 0248                            History of Present Illness       Chief Complaint   Patient presents with    Heartburn     Patient has acid reflux, states it hurts to breathe. Happened a few hours ago. Threw up and felt a little better then the pain came back        Past Medical History[1]   Past Surgical History[2]   Family History[3]   Social History[4]   E-Cigarette/Vaping    E-Cigarette Use Never User       E-Cigarette/Vaping Substances    Nicotine No     THC No     CBD No     Flavoring No     Other No     Unknown No       I have reviewed and agree with the history as documented.     HPI  Patient is a 20-year-old male presenting for reflux symptoms onset around 1 AM today.  Patient reports he has a history of stomach issues, has been evaluated by GI, negative workup.  Reports he ate a bagel right before he went to to bed.  Reports nausea, nonbloody nonbilious vomitus x 1.  Patient reports chest tightness and discomfort.  Denies fevers, chills, lower abdominal pain, bowel or urinary changes, or any other symptoms at this time.    Review of Systems  As per  HPI      Objective       ED Triage Vitals [07/13/25 0248]   Temperature Pulse Blood Pressure Respirations SpO2 Patient Position - Orthostatic VS   97.9 °F (36.6 °C) 74 120/69 20 99 % Sitting      Temp Source Heart Rate Source BP Location FiO2 (%) Pain Score    Oral Monitor Right arm -- --      Vitals      Date and Time Temp Pulse SpO2 Resp BP Pain Score FACES Pain Rating User   07/13/25 0530 -- 52 97 % -- 115/59 -- -- MIRIAM   07/13/25 0500 -- 53 97 % -- 111/57 -- --    07/13/25 0430 -- 57 98 % -- 121/57 -- --    07/13/25 0248 97.9 °F (36.6 °C) 74 99 % 20 120/69 -- -- ND            Physical Exam  Vitals and nursing note reviewed.   Constitutional:       General: He is in acute distress.      Appearance: Normal appearance. He is not ill-appearing, toxic-appearing or diaphoretic.   HENT:      Head: Normocephalic and atraumatic.      Nose: Nose normal.     Eyes:      Extraocular Movements: Extraocular movements intact.      Conjunctiva/sclera: Conjunctivae normal.       Cardiovascular:      Rate and Rhythm: Normal rate and regular rhythm.      Pulses: Normal pulses.      Heart sounds: No murmur heard.     No friction rub. No gallop.   Pulmonary:      Effort: Pulmonary effort is normal. No respiratory distress.      Breath sounds: Normal breath sounds.   Abdominal:      General: Bowel sounds are normal.      Palpations: Abdomen is soft.      Tenderness: There is abdominal tenderness in the epigastric area and left upper quadrant. There is no guarding or rebound.     Musculoskeletal:         General: No swelling or tenderness. Normal range of motion.      Cervical back: Normal range of motion. No rigidity or tenderness.     Skin:     General: Skin is warm and dry.      Capillary Refill: Capillary refill takes less than 2 seconds.     Neurological:      Mental Status: He is alert and oriented to person, place, and time.      Cranial Nerves: No cranial nerve deficit.      Sensory: No sensory deficit.      Motor: No  weakness.         Results Reviewed       Procedure Component Value Units Date/Time    HS Troponin 0hr (reflex protocol) [[id number]]  (Normal) Collected: 07/13/25 0428    Lab Status: Final result Specimen: Blood from Arm, Right Updated: 07/13/25 0503     hs TnI 0hr <2 ng/L     Comprehensive metabolic panel [[id number]]  (Abnormal) Collected: 07/13/25 0428    Lab Status: Final result Specimen: Blood from Arm, Right Updated: 07/13/25 0455     Sodium 134 mmol/L      Potassium 3.5 mmol/L      Chloride 102 mmol/L      CO2 24 mmol/L      ANION GAP 8 mmol/L      BUN 21 mg/dL      Creatinine 0.99 mg/dL      Glucose 96 mg/dL      Calcium 9.8 mg/dL      AST 16 U/L      ALT 11 U/L      Alkaline Phosphatase 35 U/L      Total Protein 7.3 g/dL      Albumin 4.9 g/dL      Total Bilirubin 0.58 mg/dL      eGFR 108 ml/min/1.73sq m     Narrative:      National Kidney Disease Foundation guidelines for Chronic Kidney Disease (CKD):     Stage 1 with normal or high GFR (GFR > 90 mL/min/1.73 square meters)    Stage 2 Mild CKD (GFR = 60-89 mL/min/1.73 square meters)    Stage 3A Moderate CKD (GFR = 45-59 mL/min/1.73 square meters)    Stage 3B Moderate CKD (GFR = 30-44 mL/min/1.73 square meters)    Stage 4 Severe CKD (GFR = 15-29 mL/min/1.73 square meters)    Stage 5 End Stage CKD (GFR <15 mL/min/1.73 square meters)  Note: GFR calculation is accurate only with a steady state creatinine    Lipase [[id number]]  (Normal) Collected: 07/13/25 0428    Lab Status: Final result Specimen: Blood from Arm, Right Updated: 07/13/25 0455     Lipase 13 u/L     CBC and differential [[id number]] Collected: 07/13/25 0428    Lab Status: Final result Specimen: Blood from Arm, Right Updated: 07/13/25 0445     WBC 8.10 Thousand/uL      RBC 4.75 Million/uL      Hemoglobin 14.6 g/dL      Hematocrit 41.7 %      MCV 88 fL      MCH 30.7 pg      MCHC 35.0 g/dL      RDW 11.9 %      MPV 9.4 fL      Platelets 310 Thousands/uL      nRBC 0 /100 WBCs      Segmented % 55 %       Immature Grans % 1 %      Lymphocytes % 30 %      Monocytes % 11 %      Eosinophils Relative 2 %      Basophils Relative 1 %      Absolute Neutrophils 4.49 Thousands/µL      Absolute Immature Grans 0.07 Thousand/uL      Absolute Lymphocytes 2.45 Thousands/µL      Absolute Monocytes 0.88 Thousand/µL      Eosinophils Absolute 0.14 Thousand/µL      Basophils Absolute 0.07 Thousands/µL             XR chest 2 views   ED Interpretation by Selina Sandhu MD (07/13 0530)   No acute findings per my independent interpretation          ECG 12 Lead Documentation Only    Date/Time: 7/13/2025 3:10 AM    Performed by: Selina Sandhu MD  Authorized by: Selina Sandhu MD    ECG reviewed by me, the ED Provider: yes    Patient location:  ED  Previous ECG:     Previous ECG:  Compared to current    Comparison ECG info:  9/15/2024    Similarity:  No change  Interpretation:     Interpretation: normal    Rate:     ECG rate:  63    ECG rate assessment: normal    Rhythm:     Rhythm: sinus rhythm    Ectopy:     Ectopy: none    QRS:     QRS axis:  Normal    QRS intervals:  Normal  Conduction:     Conduction: normal    ST segments:     ST segments:  Normal  T waves:     T waves: normal    Other findings:     Other findings: early repolarization        ED Medication and Procedure Management   Prior to Admission Medications   Prescriptions Last Dose Informant Patient Reported? Taking?   albuterol (ProAir HFA) 90 mcg/act inhaler   No No   Sig: Inhale 2 puffs every 4 (four) hours as needed for wheezing   sertraline (ZOLOFT) 50 mg tablet  Self Yes No      Facility-Administered Medications: None     Discharge Medication List as of 7/13/2025  5:28 AM        START taking these medications    Details   aluminum-magnesium hydroxide-simethicone (MAALOX) 200-200-20 MG/5ML SUSP Take 30 mL by mouth 4 (four) times a day (before meals and at bedtime), Starting Sun 7/13/2025, Normal      famotidine (PEPCID) 20 mg tablet Take 1 tablet (20 mg total) by mouth 2 (two)  times a day, Starting Sun 7/13/2025, Normal      ondansetron (ZOFRAN-ODT) 4 mg disintegrating tablet Take 1 tablet (4 mg total) by mouth every 6 (six) hours as needed for nausea or vomiting, Starting Sun 7/13/2025, Print           CONTINUE these medications which have NOT CHANGED    Details   albuterol (ProAir HFA) 90 mcg/act inhaler Inhale 2 puffs every 4 (four) hours as needed for wheezing, Starting Sun 9/15/2024, Print      sertraline (ZOLOFT) 50 mg tablet Historical Med             ED SEPSIS DOCUMENTATION   Time reflects when diagnosis was documented in both MDM as applicable and the Disposition within this note       Time User Action Codes Description Comment    7/13/2025  5:26 AM Selina Sandhu [K21.9] Acid reflux     7/13/2025  5:27 AM Selina Sandhu [R10.13] Epigastric pain                             [1]   Past Medical History:  Diagnosis Date    Allergic rhinitis     Anxiety    [2] No past surgical history on file.  [3] No family history on file.  [4]   Social History  Tobacco Use    Smoking status: Never     Passive exposure: Never    Smokeless tobacco: Never   Vaping Use    Vaping status: Never Used   Substance Use Topics    Alcohol use: Never    Drug use: Never        Selina Sandhu MD  07/13/25 0857

## 2025-07-13 NOTE — ED ATTENDING ATTESTATION
7/13/2025  I, Carlos Chapa MD, saw and evaluated the patient. I have discussed the patient with the resident/non-physician practitioner and agree with the resident's/non-physician practitioner's findings, Plan of Care, and MDM as documented in the resident's/non-physician practitioner's note, except where noted. All available labs and Radiology studies were reviewed.  I was present for key portions of any procedure(s) performed by the resident/non-physician practitioner and I was immediately available to provide assistance.       At this point I agree with the current assessment done in the Emergency Department.  I have conducted an independent evaluation of this patient a history and physical is as follows: Patient is a 20 year old male with chest tightness pain tonight for past few hours with N/V. No diarrhea. No fever. No cough. No sob. No etoh use. Has had prior episodes in the past and was told it was reflux but not as severe as tonight. No smoking. No early CAD in family. Was last seen at  [facility] in [place] on 6/25/25 for annual physical exam. [employer] website checked on this patient and no Rx found. NCAT. No scleral icterus. Moist mucous membranes. Lungs clear. Heart regular without murmur. Nontender chest wall. Abdomen soft and nontender. Good bowel sounds. No edema. No rash. No jaundice. DDX including but not limited to: ACS, MI, doubt PE (PERC negative), PTX, pneumonia, doubt dissection; pleurisy, pericarditis, myocarditis, rhabdomyolysis, GI etiology, GERD, gastritis, pancreatitis, hepatitis; doubt pneumomediastinum, mediastinitis, esophageal rupture or acute surgical intraabdominal process. I reviewed  EKG. Will check labs and CXR and give IVFs and IV pepcid and IV zofran and maalox.     ED Course         Critical Care Time  Procedures

## 2025-07-14 ENCOUNTER — OFFICE VISIT (OUTPATIENT)
Age: 20
End: 2025-07-14
Payer: COMMERCIAL

## 2025-07-14 VITALS
HEART RATE: 61 BPM | SYSTOLIC BLOOD PRESSURE: 113 MMHG | HEIGHT: 67 IN | DIASTOLIC BLOOD PRESSURE: 70 MMHG | BODY MASS INDEX: 21.97 KG/M2 | WEIGHT: 140 LBS | OXYGEN SATURATION: 98 %

## 2025-07-14 DIAGNOSIS — K21.9 ACID REFLUX: ICD-10-CM

## 2025-07-14 DIAGNOSIS — R19.8 IRREGULAR BOWEL HABITS: Primary | ICD-10-CM

## 2025-07-14 DIAGNOSIS — R10.13 EPIGASTRIC PAIN: ICD-10-CM

## 2025-07-14 PROCEDURE — 99213 OFFICE O/P EST LOW 20 MIN: CPT | Performed by: PHYSICIAN ASSISTANT

## 2025-07-14 RX ORDER — IBUPROFEN 600 MG/1
TABLET, FILM COATED ORAL
COMMUNITY
Start: 2025-04-16

## 2025-07-14 RX ORDER — DICYCLOMINE HYDROCHLORIDE 10 MG/1
10 CAPSULE ORAL 3 TIMES DAILY PRN
Qty: 90 CAPSULE | Refills: 1 | Status: SHIPPED | OUTPATIENT
Start: 2025-07-14

## 2025-07-14 RX ORDER — FAMOTIDINE 20 MG/1
20 TABLET, FILM COATED ORAL 2 TIMES DAILY
Qty: 180 TABLET | Refills: 0 | Status: SHIPPED | OUTPATIENT
Start: 2025-07-14

## 2025-07-14 RX ORDER — CYCLOBENZAPRINE HCL 10 MG
TABLET ORAL
COMMUNITY
Start: 2025-04-16

## 2025-07-14 NOTE — PROGRESS NOTES
Name: Tiburcio Aguayo      : 2005      MRN: [id number]  Encounter Provider: Sarah Stout PA-C  Encounter Date: 2025   Encounter department: [facility]  :  Assessment & Plan  Irregular bowel habits  Longstanding irregular bowel habits including loose stools frequently experience in the morning, intermittent periumbilical abdominal pain which may represent bowel spasm, inconsistent stool consistency, clinical picture most consistent with irritable bowel syndrome, less likely early IBD    - Discussed with patient about colonoscopy/endoscopic for more diagnostic information although at this time after further discussion patient prefers conservative approach and empiric treatments and arranging follow-up prior to deciding about endoscopic evaluation; in the absence of alarm symptoms I think this is reasonable for now    - To this effect we will follow-up in a few months and in the meantime check stool calprotectin and H. pylori, and also check C-reactive protein    - Will also restart patient on Bentyl as needed as patient had reported some benefit with this in the past    - Also continue H2 blocker therapy, see below  Orders:    Calprotectin,Fecal; Future    H. pylori antigen, stool; Future    C-reactive protein; Future    dicyclomine (BENTYL) 10 mg capsule; Take 1 capsule (10 mg total) by mouth 3 (three) times a day as needed (Abdominal cramping, stool urgency, diarrhea) Avoid use if constipated    Acid reflux  Epigastric pain  Patient reports recent symptoms of acid reflux including acid regurgitation, chest/throat tightness and burning prompting ER visit with generally unremarkable workup, feeling better today    -Will assess response to H2 blocker therapy, continue Pepcid twice daily for the meantime until follow-up visit in a few months    - Advised patient dietary and lifestyle modification strategies for the mitigation of GERD    Orders:    Ambulatory  Referral to Gastroenterology    Calprotectin,Fecal; Future    H. pylori antigen, stool; Future    C-reactive protein; Future    famotidine (PEPCID) 20 mg tablet; Take 1 tablet (20 mg total) by mouth 2 (two) times a day        History of Present Illness     Tiburcio Aguayo is a 20 y.o. male who presents for follow-up, he was last seen in our office about a year and a half ago in January 2024 for follow-up of symptoms of abdominal cramping and decreased appetite, having raised some concern with family history of inflammatory bowel disease.  He was not reported with any voluminous or persistently frequent diarrhea, we had given trial of Bentyl which he had responded well to.  Previous celiac disease panel in 2023 had been unremarkable.  We ordered CRP and calprotectin though I cannot find record of results from those studies at this time.    He was just seen in the emergency room yesterday, having raise concern for 3 hours of acid reflux symptoms with chest and throat tightness and burning.  Patient says he was given medications that made him feel better, he is feeling better today, he was prescribed famotidine 20 mg twice daily.  Patient says he was not taking any acid reducers regularly at the time he came to the emergency room and generally did not deal with acid reflux very often, only occasionally.  Denies any regular use of NSAIDs, does not smoke or use alcohol or marijuana he tells me.  He denies any nausea or vomiting or dysphagia.    He says he does still have ongoing issues with intermittent periumbilical cramping abdominal pain which is sometimes postprandial although not strictly, he says he often has loose stools in the morning, though stool can be of variable consistency later in the day.  He does not seem to remember having been on Bentyl.  He does not think he ever had the stool tests done that we had ordered for him at previous visits.  His brother had Crohn's disease, sister has IBS.  No known  family history of colon cancer.  He has never had an EGD or colonoscopy.  Denies any unintentional weight loss.    Chest Pain   Pertinent negatives include no back pain, cough, dizziness, fever, headaches, nausea, numbness, palpitations, shortness of breath, vomiting or weakness.       Review of Systems   Constitutional:  Negative for activity change, appetite change, chills, fatigue, fever and unexpected weight change.   HENT:  Negative for congestion, nosebleeds, rhinorrhea, sore throat and trouble swallowing.    Eyes:  Negative for pain, itching and visual disturbance.   Respiratory:  Negative for cough, shortness of breath and wheezing.    Cardiovascular:  Positive for chest pain. Negative for palpitations and leg swelling.   Gastrointestinal:  Negative for constipation, diarrhea, nausea and vomiting.   Endocrine: Negative for cold intolerance, heat intolerance and polyuria.   Genitourinary:  Negative for difficulty urinating, dysuria, flank pain, frequency and hematuria.   Musculoskeletal:  Negative for arthralgias, back pain, myalgias and neck pain.   Skin:  Negative for color change, pallor and rash.   Neurological:  Negative for dizziness, speech difficulty, weakness, numbness and headaches.   Hematological:  Does not bruise/bleed easily.   Psychiatric/Behavioral:  Negative for dysphoric mood and sleep disturbance. The patient is not nervous/anxious.     A complete review of systems is negative other than that noted above in the HPI.      Current Medications[1]  Objective   There were no vitals taken for this visit.    Physical Exam  Constitutional:       General: He is not in acute distress.     Appearance: He is well-developed. He is not diaphoretic.   HENT:      Head: Normocephalic and atraumatic.     Eyes:      Conjunctiva/sclera: Conjunctivae normal.      Pupils: Pupils are equal, round, and reactive to light.       Cardiovascular:      Rate and Rhythm: Normal rate and regular rhythm.      Heart sounds:  Normal heart sounds. No murmur heard.     No friction rub. No gallop.   Pulmonary:      Effort: Pulmonary effort is normal. No respiratory distress.      Breath sounds: Normal breath sounds. No stridor. No wheezing or rales.   Abdominal:      General: Bowel sounds are normal. There is no distension.      Palpations: Abdomen is soft. There is no mass.      Tenderness: There is no abdominal tenderness. There is no guarding or rebound.     Musculoskeletal:         General: No tenderness. Normal range of motion.      Cervical back: Normal range of motion and neck supple.     Skin:     General: Skin is warm and dry.      Coloration: Skin is not pale.      Findings: No erythema or rash.     Neurological:      Mental Status: He is alert and oriented to person, place, and time.     Psychiatric:         Behavior: Behavior normal.            Lab Results: I personally reviewed relevant lab results.                    [1]   Current Outpatient Medications   Medication Sig Dispense Refill    cyclobenzaprine (FLEXERIL) 10 mg tablet       ibuprofen (MOTRIN) 600 mg tablet       albuterol (ProAir HFA) 90 mcg/act inhaler Inhale 2 puffs every 4 (four) hours as needed for wheezing 6.7 g 0    aluminum-magnesium hydroxide-simethicone (MAALOX) 200-200-20 MG/5ML SUSP Take 30 mL by mouth 4 (four) times a day (before meals and at bedtime) 355 mL 0    famotidine (PEPCID) 20 mg tablet Take 1 tablet (20 mg total) by mouth 2 (two) times a day 30 tablet 0    ondansetron (ZOFRAN-ODT) 4 mg disintegrating tablet Take 1 tablet (4 mg total) by mouth every 6 (six) hours as needed for nausea or vomiting 20 tablet 0    sertraline (ZOLOFT) 50 mg tablet        No current facility-administered medications for this visit.

## 2025-07-15 LAB — CRP SERPL-MCNC: <1 MG/L (ref 0–10)

## 2025-07-16 LAB
ATRIAL RATE: 63 BPM
P AXIS: 77 DEGREES
PR INTERVAL: 132 MS
QRS AXIS: 82 DEGREES
QRSD INTERVAL: 94 MS
QT INTERVAL: 386 MS
QTC INTERVAL: 395 MS
T WAVE AXIS: 64 DEGREES
VENTRICULAR RATE: 63 BPM

## 2025-07-16 PROCEDURE — 93010 ELECTROCARDIOGRAM REPORT: CPT | Performed by: INTERNAL MEDICINE

## 2025-07-23 ENCOUNTER — SOCIAL WORK (OUTPATIENT)
Dept: BEHAVIORAL/MENTAL HEALTH CLINIC | Facility: CLINIC | Age: 20
End: 2025-07-23
Payer: COMMERCIAL

## 2025-07-23 DIAGNOSIS — F41.1 GENERALIZED ANXIETY DISORDER: Primary | ICD-10-CM

## 2025-07-23 PROCEDURE — 90834 PSYTX W PT 45 MINUTES: CPT

## 2025-07-23 NOTE — PSYCH
"Behavioral Health Psychotherapy Progress Note    Psychotherapy Provided: Individual Psychotherapy     1. Generalized anxiety disorder            Goals addressed in session: Goal 1     DATA: ct shared that he has had some difficulty with socializing.  Ct is guarded and focuses on making people laugh.  Ct and I discussed social skills, body language, social, cues, and social awareness.  Ct anxiety can be high ion social situations.  Ct has been spending time with new roommates on the weekends and getting to know them better.  Ct and I also discussed positive self talk as a means to minimize anxiety and reduce self doubt.  Ct is bored at home but realize she still needs to be there for father who is recovering from a severe leg break and surgery.   During this session, this clinician used the following therapeutic modalities: Cognitive Behavioral Therapy    Substance Abuse was not addressed during this session. If the client is diagnosed with a co-occurring substance use disorder, please indicate any changes in the frequency or amount of use: na. Stage of change for addressing substance use diagnoses: No substance use/Not applicable    ASSESSMENT:  Tiburcio Aguayo presents with a Anxious mood.     his affect is Normal range and intensity, which is congruent, with his mood and the content of the session. The client has made progress on their goals.    No SI/HI Tiburcio Aguayo presents with a none risk of suicide, none risk of self-harm, and none risk of harm to others.    For any risk assessment that surpasses a \"low\" rating, a safety plan must be developed.    A safety plan was indicated: no  If yes, describe in detail na    PLAN: Between sessions, Tiburcio Aguayo will manage moods. At the next session, the therapist will use Cognitive Behavioral Therapy to address anxiety social skills.    Behavioral Health Treatment Plan and Discharge Planning: Tiburcio Aguayo is aware of and agrees to continue to work on " their treatment plan. They have identified and are working toward their discharge goals. yes    Depression Follow-up Plan Completed: Not applicable    Visit start and stop times:    07/23/25  Start Time: 1455  Stop Time: 1539  Total Visit Time: 44 minutes

## 2025-08-13 ENCOUNTER — SOCIAL WORK (OUTPATIENT)
Dept: BEHAVIORAL/MENTAL HEALTH CLINIC | Facility: CLINIC | Age: 20
End: 2025-08-13
Payer: COMMERCIAL

## 2025-08-19 DIAGNOSIS — S39.012D STRAIN OF LUMBAR REGION, SUBSEQUENT ENCOUNTER: ICD-10-CM

## 2025-08-19 DIAGNOSIS — F43.20 ADJUSTMENT REACTION OF ADOLESCENCE: Primary | ICD-10-CM

## 2025-08-19 RX ORDER — CYCLOBENZAPRINE HCL 10 MG
10 TABLET ORAL AS NEEDED
Qty: 90 TABLET | Refills: 1 | Status: SHIPPED | OUTPATIENT
Start: 2025-08-19